# Patient Record
Sex: MALE | Race: BLACK OR AFRICAN AMERICAN
[De-identification: names, ages, dates, MRNs, and addresses within clinical notes are randomized per-mention and may not be internally consistent; named-entity substitution may affect disease eponyms.]

---

## 2021-08-09 PROBLEM — Z00.00 ENCOUNTER FOR PREVENTIVE HEALTH EXAMINATION: Status: ACTIVE | Noted: 2021-08-09

## 2021-08-11 ENCOUNTER — APPOINTMENT (OUTPATIENT)
Dept: HEART AND VASCULAR | Facility: CLINIC | Age: 66
End: 2021-08-11
Payer: MEDICARE

## 2021-08-11 ENCOUNTER — NON-APPOINTMENT (OUTPATIENT)
Age: 66
End: 2021-08-11

## 2021-08-11 VITALS
HEART RATE: 60 BPM | RESPIRATION RATE: 12 BRPM | DIASTOLIC BLOOD PRESSURE: 90 MMHG | SYSTOLIC BLOOD PRESSURE: 160 MMHG | BODY MASS INDEX: 35.4 KG/M2 | WEIGHT: 239 LBS | HEIGHT: 69 IN

## 2021-08-11 DIAGNOSIS — R09.89 OTHER SPECIFIED SYMPTOMS AND SIGNS INVOLVING THE CIRCULATORY AND RESPIRATORY SYSTEMS: ICD-10-CM

## 2021-08-11 DIAGNOSIS — R60.0 LOCALIZED EDEMA: ICD-10-CM

## 2021-08-11 DIAGNOSIS — B18.1 CHRONIC VIRAL HEPATITIS B W/OUT DELTA-AGENT: ICD-10-CM

## 2021-08-11 DIAGNOSIS — Z78.9 OTHER SPECIFIED HEALTH STATUS: ICD-10-CM

## 2021-08-11 DIAGNOSIS — Z86.39 PERSONAL HISTORY OF OTHER ENDOCRINE, NUTRITIONAL AND METABOLIC DISEASE: ICD-10-CM

## 2021-08-11 PROCEDURE — 93880 EXTRACRANIAL BILAT STUDY: CPT

## 2021-08-11 PROCEDURE — 99204 OFFICE O/P NEW MOD 45 MIN: CPT | Mod: 25

## 2021-08-11 PROCEDURE — 93000 ELECTROCARDIOGRAM COMPLETE: CPT

## 2021-08-11 PROCEDURE — 93306 TTE W/DOPPLER COMPLETE: CPT

## 2021-08-11 PROCEDURE — ZZZZZ: CPT

## 2021-08-11 RX ORDER — LOSARTAN POTASSIUM 50 MG/1
50 TABLET, FILM COATED ORAL
Refills: 0 | Status: ACTIVE | COMMUNITY

## 2021-08-11 RX ORDER — GLIPIZIDE 10 MG/1
10 TABLET, FILM COATED, EXTENDED RELEASE ORAL
Refills: 0 | Status: ACTIVE | COMMUNITY

## 2021-08-11 RX ORDER — METFORMIN HYDROCHLORIDE 1000 MG/1
1000 TABLET, EXTENDED RELEASE ORAL
Refills: 0 | Status: ACTIVE | COMMUNITY

## 2021-08-11 RX ORDER — AMLODIPINE BESYLATE 5 MG/1
5 TABLET ORAL DAILY
Qty: 30 | Refills: 5 | Status: ACTIVE | COMMUNITY
Start: 1900-01-01 | End: 1900-01-01

## 2021-08-11 RX ORDER — POTASSIUM CHLORIDE 1500 MG/1
20 TABLET, EXTENDED RELEASE ORAL
Refills: 0 | Status: ACTIVE | COMMUNITY

## 2021-08-11 RX ORDER — LINAGLIPTIN 5 MG/1
5 TABLET, FILM COATED ORAL
Refills: 0 | Status: ACTIVE | COMMUNITY

## 2021-08-11 RX ORDER — LOSARTAN POTASSIUM AND HYDROCHLOROTHIAZIDE 25; 100 MG/1; MG/1
100-25 TABLET ORAL
Qty: 30 | Refills: 3 | Status: ACTIVE | COMMUNITY
Start: 2021-08-11 | End: 1900-01-01

## 2021-08-11 RX ORDER — CARVEDILOL 25 MG/1
25 TABLET, FILM COATED ORAL TWICE DAILY
Refills: 0 | Status: ACTIVE | COMMUNITY

## 2021-08-11 RX ORDER — ALOGLIPTIN 25 MG/1
25 TABLET, FILM COATED ORAL
Refills: 0 | Status: ACTIVE | COMMUNITY

## 2021-08-11 RX ORDER — TENOFOVIR DISOPROXIL FUMARATE 300 MG/1
300 TABLET ORAL
Refills: 0 | Status: ACTIVE | COMMUNITY

## 2021-08-11 NOTE — DISCUSSION/SUMMARY
[FreeTextEntry1] : 1. Hypertension: Not at goal due to lower extremity edema will decrease amlodipine to 5 mg daily and add losartan hydrochlorothiazide 100 mg/25 mg one tablet daily advised to continue carvedilol and followup in 4-6 weeks. Reviewed recent blood work\par 2. Diabetes: Patient recently started on alogliptin  and tradjenta, he is to follow up with PCP for A1c and management of glucose. suggest low dose statin to maintain ldl < 70\par 3. Lower extremity edema: Followup with GI to assess for liver cirrhosis, and reevaluate after hydrochlorothiazide has been taken for 4-6 weeks advised to keep his legs elevated when seated or lying down.\par 4. Cardiac murmur: Echocardiogram\par 5. Carotid bruit: Carotid duplex\par \par Given history of hypertension and diabetes would like to rule out ischemic heart disease in this diabetic male with nonspecific ST-T wave abnormalities on EKG we will advise once results of an exercise nuclear stress test his known.

## 2021-08-11 NOTE — HISTORY OF PRESENT ILLNESS
[FreeTextEntry1] : 66-year-old male that has medical history of diabetes hypertension chronic hepatitis B comes in to establish care. Overall, patient feels well denies any chest pain shortness of breath PND orthopnea.

## 2021-08-11 NOTE — PHYSICAL EXAM
[Well Developed] : well developed [Well Nourished] : well nourished [No Acute Distress] : no acute distress [Normal Conjunctiva] : normal conjunctiva [Normal Venous Pressure] : normal venous pressure [Carotid Bruit] : carotid bruit [Normal S1, S2] : normal S1, S2 [No Rub] : no rub [No Gallop] : no gallop [Murmur] : murmur [Clear Lung Fields] : clear lung fields [Good Air Entry] : good air entry [No Respiratory Distress] : no respiratory distress  [Soft] : abdomen soft [Non Tender] : non-tender [No Masses/organomegaly] : no masses/organomegaly [Normal Bowel Sounds] : normal bowel sounds [Normal Gait] : normal gait [No Cyanosis] : no cyanosis [No Clubbing] : no clubbing [No Varicosities] : no varicosities [No Rash] : no rash [No Skin Lesions] : no skin lesions [Moves all extremities] : moves all extremities [No Focal Deficits] : no focal deficits [Normal Speech] : normal speech [Alert and Oriented] : alert and oriented [Normal memory] : normal memory [de-identified] : agustina [de-identified] : 2+ b/l le edema

## 2021-08-18 ENCOUNTER — NON-APPOINTMENT (OUTPATIENT)
Age: 66
End: 2021-08-18

## 2021-08-18 ENCOUNTER — APPOINTMENT (OUTPATIENT)
Dept: HEART AND VASCULAR | Facility: CLINIC | Age: 66
End: 2021-08-18
Payer: MEDICARE

## 2021-08-18 VITALS
SYSTOLIC BLOOD PRESSURE: 140 MMHG | BODY MASS INDEX: 35.4 KG/M2 | DIASTOLIC BLOOD PRESSURE: 90 MMHG | HEART RATE: 69 BPM | HEIGHT: 69 IN | WEIGHT: 239 LBS

## 2021-08-18 DIAGNOSIS — I10 ESSENTIAL (PRIMARY) HYPERTENSION: ICD-10-CM

## 2021-08-18 DIAGNOSIS — R01.1 CARDIAC MURMUR, UNSPECIFIED: ICD-10-CM

## 2021-08-18 PROCEDURE — 96374 THER/PROPH/DIAG INJ IV PUSH: CPT | Mod: 59

## 2021-08-18 PROCEDURE — 78452 HT MUSCLE IMAGE SPECT MULT: CPT

## 2021-08-18 PROCEDURE — 93015 CV STRESS TEST SUPVJ I&R: CPT

## 2021-08-18 PROCEDURE — A9500: CPT

## 2021-10-06 PROBLEM — I10 ESSENTIAL HYPERTENSION: Status: ACTIVE | Noted: 2021-08-11
